# Patient Record
(demographics unavailable — no encounter records)

---

## 2025-03-26 NOTE — DISCUSSION/SUMMARY
[Risks Associated with Driving/[place] Law] : As per my usual protocol, the patient was advised in regards to risks and driving privileges associated with the [place] Guidelines.  [Safety Recommendations] : The patient was advised in regards to the risk of seizures and general seizure safety recommendations including not to be bathing alone, climbing to high places and operating heavy machinery. [Compliance with Medications] : The importance of compliance with medications was reinforced. [Medication Side Effects] : High frequency and serious potential medication adverse effects were reviewed with the patient, including but not exclusive to psychiatric effects.  Information sheets on medication side effects were made available to the patient in our clinic.  The patient or advocate agrees to notify us for any concerns. [Risk of Death] : Risk of death associated with seizures / SUDEP was discussed. 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

## 2025-03-26 NOTE — HISTORY OF PRESENT ILLNESS
[FreeTextEntry1] : Interim Hx: 3/26/2025  Doing well. No seizures Compliant with Keppra 500mg BID Missed almost 2 weeks bc pharmacy said they did not have it Never was able to get nayzilam  Partner had baby boy 1 week ago  Currently not working ___________________ Interim Hx: 2024  Doing well. 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) _____________________ Interim Hx: 2024  Started Keppra 500mg BID No additional seizures  AEEG 3/11-3/13/2024- There were indicators of Left fronto-temporal (maximal F7) cerebral dysfunction  MRI brain w/wout contrast completed 2/10/2024 Read as unremarkable, however on independent review appears to be hippocampal asymmetry     Quit job at [employer] Exploring options ___________________ Initial Hx: 2024 28 yo M with no significant PMHx presents for evaluation of episodes of LOC suspected to be seizures   Patient reports episodes of LOC have occurred on three occasions in lifetime [place] 2021- [place], [place] to hospital in [place] 2024- at home, taken to [facility]    Recent event witnessed by girlfriend who reported he passed out, eyes rolled back and shaking. He came too quickly and then occurred again. Girlfriend called ambulance and he was taken to [facility]. He was observed for a few hours and discharged home. He states he always knows when it's happening. Feels a sensation rising in his stomach and feels dizzy for about a minute before losing consciousness.   No FHx of epilepsy, no history of head trauma, meningitis or stroke  Routine EEG completed yesterday 2024  Abnormal Outpatient routine EE) Rare left frontotemporal spike and wave discharges 2) Rare intermittent left temporal polymorphic delta slowing.  No PMHx No current meds NKDA  Social Hx:  Works with [employer] no current alcohol or cigarette use rare marijuana Does drive

## 2025-03-26 NOTE — PHYSICAL EXAM
[General Appearance - Alert] : alert [General Appearance - In No Acute Distress] : in no acute distress [Oriented To Time, Place, And Person] : oriented to person, place, and time [Person] : oriented to person [Place] : oriented to place [Time] : oriented to time [Fluency] : fluency intact [Cranial Nerves Optic (II)] : visual acuity intact bilaterally,  visual fields full to confrontation, pupils equal round and reactive to light [Cranial Nerves Oculomotor (III)] : extraocular motion intact [Cranial Nerves Trigeminal (V)] : facial sensation intact symmetrically [Cranial Nerves Facial (VII)] : face symmetrical [Cranial Nerves Vestibulocochlear (VIII)] : hearing was intact bilaterally [Cranial Nerves Accessory (XI - Cranial And Spinal)] : head turning and shoulder shrug symmetric [Motor Tone] : muscle tone was normal in all four extremities [Motor Strength] : muscle strength was normal in all four extremities [Sensation Tactile Decrease] : light touch was intact [Abnormal Walk] : normal gait [Balance] : balance was intact [Coordination - Dysmetria Impaired Finger-to-Nose Bilateral] : not present